# Patient Record
Sex: MALE | Race: WHITE | NOT HISPANIC OR LATINO | Employment: PART TIME | URBAN - METROPOLITAN AREA
[De-identification: names, ages, dates, MRNs, and addresses within clinical notes are randomized per-mention and may not be internally consistent; named-entity substitution may affect disease eponyms.]

---

## 2023-09-07 PROBLEM — G62.9 NEUROPATHY: Status: ACTIVE | Noted: 2023-09-07

## 2023-09-07 PROBLEM — K21.9 GASTROESOPHAGEAL REFLUX DISEASE: Status: ACTIVE | Noted: 2023-09-07

## 2023-09-07 PROBLEM — N20.0 NEPHROLITHIASIS: Status: ACTIVE | Noted: 2023-09-07

## 2023-09-07 PROBLEM — R93.5 ABNORMAL CT OF THE ABDOMEN: Status: ACTIVE | Noted: 2023-09-07

## 2023-09-07 PROBLEM — S46.002S ROTATOR CUFF INJURY, LEFT, SEQUELA: Status: ACTIVE | Noted: 2023-09-07

## 2023-09-07 PROBLEM — G89.29 CHRONIC GENERALIZED ABDOMINAL PAIN: Status: ACTIVE | Noted: 2023-09-07

## 2023-09-07 PROBLEM — N52.9 ERECTILE DYSFUNCTION: Status: ACTIVE | Noted: 2023-09-07

## 2023-09-07 PROBLEM — R10.84 CHRONIC GENERALIZED ABDOMINAL PAIN: Status: ACTIVE | Noted: 2023-09-07

## 2023-09-07 PROBLEM — F41.8 DEPRESSION WITH ANXIETY: Status: ACTIVE | Noted: 2023-09-07

## 2023-09-07 PROBLEM — R79.89 LOW TESTOSTERONE: Status: ACTIVE | Noted: 2023-09-07

## 2023-09-07 PROBLEM — S49.90XA SHOULDER INJURY: Status: ACTIVE | Noted: 2023-09-07

## 2023-09-07 PROBLEM — K62.5 RECTAL BLEEDING: Status: ACTIVE | Noted: 2023-09-07

## 2023-09-07 PROBLEM — N32.89 MASS OF BLADDER: Status: ACTIVE | Noted: 2023-09-07

## 2023-09-07 PROBLEM — G25.81 RESTLESS LEG SYNDROME: Status: ACTIVE | Noted: 2023-09-07

## 2023-09-07 PROBLEM — M19.90 ARTHRITIS: Status: ACTIVE | Noted: 2023-09-07

## 2023-09-07 PROBLEM — J35.8 TONSIL STONE: Status: ACTIVE | Noted: 2023-09-07

## 2023-09-07 PROBLEM — I10 BENIGN HYPERTENSION: Status: ACTIVE | Noted: 2023-09-07

## 2023-09-07 PROBLEM — R53.83 FATIGUE: Status: ACTIVE | Noted: 2023-09-07

## 2023-09-07 PROBLEM — F31.9 BIPOLAR 1 DISORDER (MULTI): Status: ACTIVE | Noted: 2023-09-07

## 2023-09-07 PROBLEM — R35.0 URINE FREQUENCY: Status: ACTIVE | Noted: 2023-09-07

## 2023-09-07 PROBLEM — E55.9 VITAMIN D DEFICIENCY: Status: ACTIVE | Noted: 2023-09-07

## 2023-09-07 RX ORDER — CYCLOBENZAPRINE HCL 10 MG
10 TABLET ORAL 3 TIMES DAILY
COMMUNITY
Start: 2019-10-21

## 2023-09-07 RX ORDER — LORAZEPAM 0.5 MG/1
0.5 TABLET ORAL
COMMUNITY

## 2023-09-07 RX ORDER — OMEPRAZOLE 40 MG/1
1 CAPSULE, DELAYED RELEASE ORAL DAILY
COMMUNITY
Start: 2019-02-27

## 2023-09-07 RX ORDER — AMLODIPINE BESYLATE 5 MG/1
1 TABLET ORAL DAILY
COMMUNITY
Start: 2019-07-25

## 2023-09-07 RX ORDER — HYDROCODONE BITARTRATE AND ACETAMINOPHEN 5; 325 MG/1; MG/1
1 TABLET ORAL EVERY 6 HOURS PRN
COMMUNITY

## 2023-09-07 RX ORDER — OXYCODONE AND ACETAMINOPHEN 5; 325 MG/1; MG/1
1 TABLET ORAL DAILY PRN
COMMUNITY
Start: 2021-03-31

## 2023-09-07 RX ORDER — DIAZEPAM 5 MG/1
5 TABLET ORAL DAILY PRN
COMMUNITY
Start: 2023-04-24

## 2023-09-07 RX ORDER — OXYCODONE AND ACETAMINOPHEN 7.5; 325 MG/1; MG/1
1 TABLET ORAL 2 TIMES DAILY PRN
COMMUNITY
Start: 2021-03-24

## 2023-09-07 RX ORDER — GABAPENTIN 600 MG/1
600 TABLET ORAL 3 TIMES DAILY
COMMUNITY
End: 2023-12-26

## 2023-09-07 RX ORDER — TAMSULOSIN HYDROCHLORIDE 0.4 MG/1
0.4 CAPSULE ORAL
COMMUNITY

## 2023-09-07 RX ORDER — GABAPENTIN 300 MG/1
1 CAPSULE ORAL 3 TIMES DAILY
COMMUNITY
Start: 2018-12-21

## 2023-09-07 RX ORDER — IBUPROFEN 600 MG/1
1 TABLET ORAL 3 TIMES DAILY PRN
COMMUNITY
Start: 2019-10-21 | End: 2023-12-26

## 2023-09-07 RX ORDER — DIVALPROEX SODIUM 500 MG/1
500 TABLET, FILM COATED, EXTENDED RELEASE ORAL DAILY
COMMUNITY
End: 2024-05-06

## 2023-12-23 DIAGNOSIS — G62.9 POLYNEUROPATHY, UNSPECIFIED: ICD-10-CM

## 2023-12-26 RX ORDER — GABAPENTIN 600 MG/1
TABLET ORAL
Qty: 90 TABLET | Refills: 2 | Status: SHIPPED | OUTPATIENT
Start: 2023-12-26 | End: 2024-03-27

## 2023-12-26 RX ORDER — IBUPROFEN 600 MG/1
TABLET ORAL
Qty: 90 TABLET | Refills: 11 | Status: SHIPPED | OUTPATIENT
Start: 2023-12-26

## 2024-01-11 ASSESSMENT — ENCOUNTER SYMPTOMS
APPETITE CHANGE: 0
ABDOMINAL PAIN: 0
FEVER: 0
CHILLS: 0
VOMITING: 0
COUGH: 0
SHORTNESS OF BREATH: 0
NAUSEA: 0
HEADACHES: 0
DIARRHEA: 0

## 2024-01-11 NOTE — PROGRESS NOTES
[facility]: [facility]  PHYSICAL EXAM      Tevin Sierra is a 48 y.o. male that is presenting today for No chief complaint on file..    Assessment/Plan   Diagnoses and all orders for this visit:  Annual physical exam  Benign hypertension  Gastroesophageal reflux disease without esophagitis  Erectile dysfunction, unspecified erectile dysfunction type  Vitamin D deficiency  Neuropathy  Bipolar 1 disorder (CMS/HCC)  Depression with anxiety    Subjective   HPI  The patient is here for physical exam and follow up.  We reviewed the medical, family and social history as outlined below.  We reviewed any currently prescribed medications.  We reviewed the screening and prevention schedule in detail.    Review of Systems   Constitutional:  Negative for appetite change, chills and fever.   Respiratory:  Negative for cough and shortness of breath.    Cardiovascular:  Negative for chest pain.   Gastrointestinal:  Negative for abdominal pain, diarrhea, nausea and vomiting.   Neurological:  Negative for headaches.   All other systems reviewed and are negative.     Objective   There were no vitals filed for this visit.  There is no height or weight on file to calculate BMI.  Physical Exam  Vitals reviewed.   Constitutional:       General: He is not in acute distress.     Appearance: He is not toxic-appearing.   HENT:      Head: Normocephalic and atraumatic.      Mouth/Throat:      Mouth: Mucous membranes are moist.   Eyes:      Pupils: Pupils are equal, round, and reactive to light.   Cardiovascular:      Rate and Rhythm: Normal rate and regular rhythm.      Heart sounds: No murmur heard.  Pulmonary:      Breath sounds: Normal breath sounds. No wheezing, rhonchi or rales.   Abdominal:      General: There is no distension.      Palpations: Abdomen is soft.   Musculoskeletal:      Right lower leg: No edema.      Left lower leg: No edema.   Neurological:      General: No focal deficit present.      Mental  "Status: He is alert and oriented to person, place, and time.       Diagnostic Results   Lab Results   Component Value Date    GLUCOSE 94 01/09/2023    CALCIUM 9.8 01/09/2023     01/09/2023    K 4.8 01/09/2023    CO2 28 01/09/2023     01/09/2023    BUN 14 01/09/2023    CREATININE 0.8 01/09/2023     Lab Results   Component Value Date    ALT 30 01/09/2023    AST 35 01/09/2023    ALKPHOS 87 01/09/2023    BILITOT 0.7 01/09/2023     Lab Results   Component Value Date    WBC 6.3 01/09/2023    HGB 15.4 01/09/2023    HCT 45.1 01/09/2023    MCV 99.8 01/09/2023     01/09/2023     Lab Results   Component Value Date    CHOL 332 (H) 01/09/2023    CHOL 292 (H) 10/23/2019    CHOL 266 (H) 07/08/2019     Lab Results   Component Value Date     01/09/2023    .6 10/23/2019    HDL 91.8 07/08/2019     Lab Results   Component Value Date    LDLCALC 166 (H) 01/09/2023     Lab Results   Component Value Date    TRIG 55 01/09/2023    TRIG 95 10/23/2019    TRIG 117 07/08/2019     No components found for: \"CHOLHDL\"  No results found for: \"HGBA1C\"  Other labs not included in the list above were reviewed either before or during this encounter.    History   Past Medical History:   Diagnosis Date    Other specified health status     No pertinent past medical history     Past Surgical History:   Procedure Laterality Date    OTHER SURGICAL HISTORY  11/11/2019    Renal lithotripsy     No family history on file.  Social History     Socioeconomic History    Marital status:      Spouse name: Not on file    Number of children: Not on file    Years of education: Not on file    Highest education level: Not on file   Occupational History    Not on file   Tobacco Use    Smoking status: Not on file    Smokeless tobacco: Not on file   Substance and Sexual Activity    Alcohol use: Not on file    Drug use: Not on file    Sexual activity: Not on file   Other Topics Concern    Not on file   Social History Narrative    Not on " file     Social Determinants of Health     Financial Resource Strain: Not on file   Food Insecurity: Not on file   Transportation Needs: Not on file   Physical Activity: Not on file   Stress: Not on file   Social Connections: Not on file   Intimate Partner Violence: Not on file   Housing Stability: Not on file     No Known Allergies  Current Outpatient Medications on File Prior to Visit   Medication Sig Dispense Refill    amLODIPine (Norvasc) 5 mg tablet Take 1 tablet (5 mg) by mouth once daily.      cyclobenzaprine (Flexeril) 10 mg tablet Take 1 tablet (10 mg) by mouth 3 times a day.      diazePAM (Valium) 5 mg tablet Take 1 tablet (5 mg) by mouth once daily as needed.      divalproex (Depakote ER) 500 mg 24 hr tablet Take 1 tablet (500 mg) by mouth once daily.      gabapentin (Neurontin) 300 mg capsule Take 1 capsule (300 mg) by mouth 3 times a day.      gabapentin (Neurontin) 600 mg tablet TAKE 1 TABLET BY MOUTH 3 TIMES A DAY FOR 30 DAYS 90 tablet 2    HYDROcodone-acetaminophen (Norco) 5-325 mg tablet Take 1 tablet by mouth every 6 hours if needed (pain).      ibuprofen 600 mg tablet 1 TABLET WITH FOOD OR MILK AS NEEDED ORAL THREE TIMES PER DAY 30 DAYS 90 tablet 11    LORazepam (Ativan) 0.5 mg tablet Take 1 tablet (0.5 mg) by mouth.      omeprazole (PriLOSEC) 40 mg DR capsule Take 1 capsule (40 mg) by mouth once daily.      oxyCODONE-acetaminophen (Percocet) 5-325 mg tablet Take 1 tablet by mouth once daily as needed (pain).      oxyCODONE-acetaminophen (Percocet) 7.5-325 mg tablet Take 1 tablet by mouth 2 times a day as needed (shoulder pain).      tamsulosin (Flomax) 0.4 mg 24 hr capsule Take 1 capsule (0.4 mg) by mouth.       No current facility-administered medications on file prior to visit.     Immunization History   Administered Date(s) Administered    Pf[employer] Purple Cap SARS-CoV-2 04/07/2021     Patient's medical history was reviewed and updated either before or during this encounter.       Praveen REYEZ  MD Lulu

## 2024-01-15 ENCOUNTER — APPOINTMENT (OUTPATIENT)
Dept: PRIMARY CARE | Facility: CLINIC | Age: 49
End: 2024-01-15

## 2024-02-09 ENCOUNTER — APPOINTMENT (OUTPATIENT)
Dept: PRIMARY CARE | Facility: CLINIC | Age: 49
End: 2024-02-09

## 2024-03-26 DIAGNOSIS — G62.9 POLYNEUROPATHY, UNSPECIFIED: ICD-10-CM

## 2024-03-27 RX ORDER — GABAPENTIN 600 MG/1
600 TABLET ORAL 3 TIMES DAILY
Qty: 90 TABLET | Refills: 11 | Status: SHIPPED | OUTPATIENT
Start: 2024-03-27

## 2024-05-04 DIAGNOSIS — F31.9 BIPOLAR 1 DISORDER (MULTI): Primary | ICD-10-CM

## 2024-05-06 RX ORDER — DIVALPROEX SODIUM 500 MG/1
TABLET, FILM COATED, EXTENDED RELEASE ORAL
Qty: 180 TABLET | Refills: 3 | Status: SHIPPED | OUTPATIENT
Start: 2024-05-06

## 2024-12-26 DIAGNOSIS — G62.9 POLYNEUROPATHY, UNSPECIFIED: ICD-10-CM

## 2024-12-26 RX ORDER — IBUPROFEN 600 MG/1
TABLET ORAL
Qty: 90 TABLET | Refills: 11 | Status: SHIPPED | OUTPATIENT
Start: 2024-12-26

## 2024-12-26 NOTE — TELEPHONE ENCOUNTER
LV 1-9-23  NV not yet scheduled. Patient states he can wait for provider to return to office for refill